# Patient Record
Sex: FEMALE | Race: BLACK OR AFRICAN AMERICAN | NOT HISPANIC OR LATINO
[De-identification: names, ages, dates, MRNs, and addresses within clinical notes are randomized per-mention and may not be internally consistent; named-entity substitution may affect disease eponyms.]

---

## 2018-08-09 ENCOUNTER — TRANSCRIPTION ENCOUNTER (OUTPATIENT)
Age: 43
End: 2018-08-09

## 2018-12-04 ENCOUNTER — RESULT REVIEW (OUTPATIENT)
Age: 43
End: 2018-12-04

## 2022-11-10 ENCOUNTER — EMERGENCY (EMERGENCY)
Facility: HOSPITAL | Age: 47
LOS: 1 days | Discharge: HOME | End: 2022-11-10
Attending: EMERGENCY MEDICINE | Admitting: EMERGENCY MEDICINE
Payer: COMMERCIAL

## 2022-11-10 VITALS
HEART RATE: 60 BPM | SYSTOLIC BLOOD PRESSURE: 162 MMHG | DIASTOLIC BLOOD PRESSURE: 111 MMHG | TEMPERATURE: 98 F | OXYGEN SATURATION: 100 % | RESPIRATION RATE: 16 BRPM

## 2022-11-10 VITALS
DIASTOLIC BLOOD PRESSURE: 130 MMHG | OXYGEN SATURATION: 100 % | SYSTOLIC BLOOD PRESSURE: 188 MMHG | TEMPERATURE: 98 F | RESPIRATION RATE: 16 BRPM | HEART RATE: 72 BPM

## 2022-11-10 LAB
ALBUMIN SERPL ELPH-MCNC: 4.3 G/DL — SIGNIFICANT CHANGE UP (ref 3.3–5)
ALP SERPL-CCNC: 78 U/L — SIGNIFICANT CHANGE UP (ref 40–120)
ALT FLD-CCNC: 15 U/L — SIGNIFICANT CHANGE UP (ref 4–33)
ANION GAP SERPL CALC-SCNC: 12 MMOL/L — SIGNIFICANT CHANGE UP (ref 7–14)
AST SERPL-CCNC: 16 U/L — SIGNIFICANT CHANGE UP (ref 4–32)
BASOPHILS # BLD AUTO: 0 K/UL — SIGNIFICANT CHANGE UP (ref 0–0.2)
BASOPHILS NFR BLD AUTO: 0 % — SIGNIFICANT CHANGE UP (ref 0–2)
BILIRUB SERPL-MCNC: 0.5 MG/DL — SIGNIFICANT CHANGE UP (ref 0.2–1.2)
BUN SERPL-MCNC: 21 MG/DL — SIGNIFICANT CHANGE UP (ref 7–23)
CALCIUM SERPL-MCNC: 9.9 MG/DL — SIGNIFICANT CHANGE UP (ref 8.4–10.5)
CHLORIDE SERPL-SCNC: 97 MMOL/L — LOW (ref 98–107)
CO2 SERPL-SCNC: 27 MMOL/L — SIGNIFICANT CHANGE UP (ref 22–31)
CREAT SERPL-MCNC: 1.31 MG/DL — HIGH (ref 0.5–1.3)
EGFR: 50 ML/MIN/1.73M2 — LOW
EOSINOPHIL # BLD AUTO: 0.12 K/UL — SIGNIFICANT CHANGE UP (ref 0–0.5)
EOSINOPHIL NFR BLD AUTO: 1.8 % — SIGNIFICANT CHANGE UP (ref 0–6)
GLUCOSE SERPL-MCNC: 82 MG/DL — SIGNIFICANT CHANGE UP (ref 70–99)
HCT VFR BLD CALC: 47.3 % — HIGH (ref 34.5–45)
HGB BLD-MCNC: 15.3 G/DL — SIGNIFICANT CHANGE UP (ref 11.5–15.5)
IANC: 3.73 K/UL — SIGNIFICANT CHANGE UP (ref 1.8–7.4)
LYMPHOCYTES # BLD AUTO: 1.23 K/UL — SIGNIFICANT CHANGE UP (ref 1–3.3)
LYMPHOCYTES # BLD AUTO: 18.7 % — SIGNIFICANT CHANGE UP (ref 13–44)
MCHC RBC-ENTMCNC: 29.5 PG — SIGNIFICANT CHANGE UP (ref 27–34)
MCHC RBC-ENTMCNC: 32.3 GM/DL — SIGNIFICANT CHANGE UP (ref 32–36)
MCV RBC AUTO: 91.3 FL — SIGNIFICANT CHANGE UP (ref 80–100)
MONOCYTES # BLD AUTO: 0.58 K/UL — SIGNIFICANT CHANGE UP (ref 0–0.9)
MONOCYTES NFR BLD AUTO: 8.9 % — SIGNIFICANT CHANGE UP (ref 2–14)
NEUTROPHILS # BLD AUTO: 4.22 K/UL — SIGNIFICANT CHANGE UP (ref 1.8–7.4)
NEUTROPHILS NFR BLD AUTO: 61.6 % — SIGNIFICANT CHANGE UP (ref 43–77)
PLATELET # BLD AUTO: 285 K/UL — SIGNIFICANT CHANGE UP (ref 150–400)
POTASSIUM SERPL-MCNC: 3.8 MMOL/L — SIGNIFICANT CHANGE UP (ref 3.5–5.3)
POTASSIUM SERPL-SCNC: 3.8 MMOL/L — SIGNIFICANT CHANGE UP (ref 3.5–5.3)
PROT SERPL-MCNC: 8.5 G/DL — HIGH (ref 6–8.3)
RBC # BLD: 5.18 M/UL — SIGNIFICANT CHANGE UP (ref 3.8–5.2)
RBC # FLD: 13.9 % — SIGNIFICANT CHANGE UP (ref 10.3–14.5)
SODIUM SERPL-SCNC: 136 MMOL/L — SIGNIFICANT CHANGE UP (ref 135–145)
WBC # BLD: 6.56 K/UL — SIGNIFICANT CHANGE UP (ref 3.8–10.5)
WBC # FLD AUTO: 6.56 K/UL — SIGNIFICANT CHANGE UP (ref 3.8–10.5)

## 2022-11-10 PROCEDURE — 70450 CT HEAD/BRAIN W/O DYE: CPT | Mod: 26,MA

## 2022-11-10 PROCEDURE — 99285 EMERGENCY DEPT VISIT HI MDM: CPT

## 2022-11-10 RX ORDER — METOCLOPRAMIDE HCL 10 MG
10 TABLET ORAL ONCE
Refills: 0 | Status: COMPLETED | OUTPATIENT
Start: 2022-11-10 | End: 2022-11-10

## 2022-11-10 RX ADMIN — Medication 10 MILLIGRAM(S): at 13:45

## 2022-11-10 NOTE — ED PROVIDER NOTE - PATIENT PORTAL LINK FT
You can access the FollowMyHealth Patient Portal offered by [facility] by registering at the following website: [email or web address]. By joining [employer]’s FollowMyHealth portal, you will also be able to view your health information using other applications (apps) compatible with our system.

## 2022-11-10 NOTE — ED ADULT TRIAGE NOTE - CHIEF COMPLAINT QUOTE
states " I am having high blood pressure with severe head ache since 2 weeks , was seen by my doctor and was advised to come to ER and I was trying home remedies" patient now c/o severe head ache and pressure is consistently high " BEFAST negative. also states she went [facility] for general weakness and was diagnosed as strep throat and started on antibiotics.

## 2022-11-10 NOTE — ED ADULT NURSE NOTE - OBJECTIVE STATEMENT
Pt presents to ED ambulatory with steady gait c/o HTN, HA, dizziness, weakness, and CP. Pt states the elevated BP started in the beginning of october, HA, dizziness, nausea, and weakness started about 2 weeks ago, and CP started 2 days ago. Pt does have hx of HTN and is on several medications which she states she has been compliant with including losartan 100mg, spironolactone 25mg, atenolol, and was just started on amlodipine 5mg 3 days ago. Pt states she ran out of her losartan yesterday and has not taken it this morning. Pt also states she was dx with pink eye and strep throat this week on tuesday. no focal deficits noted. States ENRIQUEZ is somewhat relieved with aleve. Denies any other medical complaints. 20G Nohelia placed . Flushed well with 10cc NS with good blood return. No s/s discomfort or erythema at insertion site. NSR on cardiac monitor. Family at bedside. call bell within reach. Education provided to pt on how to and when to use call bell for assistance. Will continue to monitor. CHARLES Rapp

## 2022-11-10 NOTE — ED PROVIDER NOTE - OBJECTIVE STATEMENT
47yF w/pmhx HTN, RA, CKD 3 presenting with headache and hypertension. Pt reports over the past 2 weeks she has intermittent posterior headaches, 8/10 with photophobia when her blood pressure is elevated. Pt reports blood pressure reading of <200/100. Pt saw her PMD on 10/31 and was prescribed Amlodipine 5mg, Spironolactone 25mg, Atenolol/Chlorthalidone 50/25mg in addition to Losartan 100 which she was already taking. Additionally pt reports 2-3 days ago she had subjective fever, neck pain, throat and was diagnosed with strep throat and started on amoxicillin. Pt reports her blood pressure continues to be elevated at home. Pt denies blurry vision, cp, sob, weakness, dizziness, abd pain, n/v/d, numbness/tingling, difficulty speaking/swallowing, leg pain or swelling, recent travel or any other concerns.

## 2022-11-10 NOTE — ED PROVIDER NOTE - NSFOLLOWUPINSTRUCTIONS_ED_ALL_ED_FT
You should use headache medicine, including:  Tylenol up to 1000mg up to 4x per day  and Alleve 2 tabs up to 2x per day, with food, as needed for breakthrough    You should continue your Losartan as prescribed, once daily.  You can use Amlodipine and Metoprolol/Chlorthalidone as well.  Hold your Spironolactone for now- follow up with Cardiology and bring record of BP    Return to Emergency for worsening pain, fever, weakness, numbness, vomiting, vision change, speech change, neck pain, difficulty walking, shortness of breath, or any signs of distress.

## 2022-11-10 NOTE — ED PROVIDER NOTE - NS ED ATTENDING STATEMENT MOD
This was a shared visit with the JERROD. I reviewed and verified the documentation and independently performed the documented:

## 2022-11-10 NOTE — ED PROVIDER NOTE - CARE PLAN
1 Principal Discharge DX:	Headache  Secondary Diagnosis:	HTN (hypertension)   [facility]: [phone]

## 2022-11-10 NOTE — ED PROVIDER NOTE - CLINICAL SUMMARY MEDICAL DECISION MAKING FREE TEXT BOX
47yF w/pmhx HTN, RA, CKD 3 presenting with headache and hypertension x 2 weeks. Recently saw his PMD and had amlodipine, Spironolactone, atenolol, chlorthalidone added to losartan. On exam pt is well appearing, no headache at time of exam, asymptomatic, non focal neuro exam. 47yF w/pmhx HTN, RA, CKD 3 presenting with headache and hypertension x 2 weeks. Recently saw his PMD and had amlodipine, Spironolactone, atenolol, chlorthalidone added to losartan. On exam pt is well appearing, no headache at time of exam, asymptomatic, non focal neuro exam. Given she reports this is a new headache will get CT head. Plan: cbc/cmp, CT head. pt offered and refused pain medication at time of initial evaluation.

## 2022-11-10 NOTE — ED PROVIDER NOTE - ATTENDING APP SHARED VISIT CONTRIBUTION OF CARE
Seen and examined, states elev. BP since Aug., was taking Losartan/HCTZ, then was Rx amlodipine to add but did not fill. In early Oct., began with HAs, mostly posterior HA, often at night and would take Alleve x 6 tabs at once with total relief of HA, and not require meds again for that day. Initially was having HA every 2 to 3d, then every other day, now for 2wks nearly daily. No neck pain, no vomiting, no fall/injury, no use of blood thinner. Went to MD again 10/31 and Rx new BP meds, but ran out of Losartan so taking only new meds. Now with SBP 180s-200, episodic HAs daily, and inc. LUQ pains after taking Alleves. No melena, no BRBPR, no N/V, no hx of PUD/gastritis. +hx of kidney disease. KINZA, EOMI, neck supple, full ROM, clear lungs, heart reg, abd soft, NT to palp, no CVAT, no edema, NT calves, strength 5/5, ambulates easily in ED.

## 2022-11-10 NOTE — ED PROVIDER NOTE - PROGRESS NOTE DETAILS
EDWIN Guerra: discussed CT head with radiology, findings consistent with microvascular disease. Pt reports she is feeling will. Discharged home with PMD follow up. Attempted to reach PMD to discuss medication changes.

## 2022-11-10 NOTE — ED ADULT NURSE NOTE - CHPI ED NUR SYMPTOMS NEG
no back pain/no chills/no congestion/no diaphoresis/no fever/no shortness of breath/no syncope/no vomiting

## 2024-03-11 NOTE — ED PROVIDER NOTE - SECONDARY DIAGNOSIS.
Advised to take OTC cough medication.    
The patient has symptoms and findings consistent with acute sinusitis. Begin antibiotic therapy and symptomatic treatment for congestion and headache. Side effects advised.     
HTN (hypertension)
